# Patient Record
Sex: FEMALE | Race: BLACK OR AFRICAN AMERICAN | NOT HISPANIC OR LATINO | Employment: FULL TIME | URBAN - METROPOLITAN AREA
[De-identification: names, ages, dates, MRNs, and addresses within clinical notes are randomized per-mention and may not be internally consistent; named-entity substitution may affect disease eponyms.]

---

## 2019-09-03 PROBLEM — F41.1 GAD (GENERALIZED ANXIETY DISORDER): Status: ACTIVE | Noted: 2019-09-03

## 2020-10-17 ENCOUNTER — NURSE TRIAGE (OUTPATIENT)
Dept: ADMINISTRATIVE | Facility: CLINIC | Age: 25
End: 2020-10-17

## 2022-07-21 PROBLEM — N39.0 UTI (URINARY TRACT INFECTION): Status: ACTIVE | Noted: 2022-07-21

## 2022-07-21 PROBLEM — J45.20 MILD INTERMITTENT ASTHMA: Status: ACTIVE | Noted: 2022-07-21

## 2022-07-21 PROBLEM — A41.9 SEVERE SEPSIS: Status: ACTIVE | Noted: 2022-07-21

## 2022-07-21 PROBLEM — R65.20 SEVERE SEPSIS: Status: ACTIVE | Noted: 2022-07-21

## 2023-04-04 NOTE — TELEPHONE ENCOUNTER
Reason for Disposition   Palpitations, skipped heart beat, or rapid heart beat   Nursing judgment    Additional Information   Negative: Severe difficulty breathing (e.g., struggling for each breath, speaks in single words)   Negative: Bluish (or gray) lips or face now   Negative: Difficult to awaken or acting confused (e.g., disoriented, slurred speech)   Negative: Hysterical or combative behavior   Negative: Sounds like a life-threatening emergency to the triager   Negative: Chest pain   Negative: Nursing judgment   Negative: Information only call; adult is not ill or injured   Negative: Nursing judgment   Negative: Nursing judgment   Negative: Nursing judgment   Negative: Nursing judgment   Negative: Nursing judgment   Negative: Nursing judgment   Negative: Nursing judgment   Negative: Nursing judgment   Negative: Nursing judgment    Protocols used: ANXIETY AND PANIC ATTACK-A-, NO GUIDELINE OR REFERENCE [id number]  pt speaking rapidly and loudly re not sure if she is having a anxiety attack. Denies pain. Pt repeatedly states she has a hx anxiety. Pt admits to concern re when getting up too fast gets off balance. Pt states she is  doing house work today.  parent had birthday party recently but pt denies being exposed to virus because everyone at party wearing masks. /99 T97.6 O2 sat=? , having heart palps. can feel heart beating form chest. O2 sat=100/150. Pt read online to do exercise to bring heart rate down.  O2 sat +99/151. Having acid reflux for a minute. Eating and drinking- pt states she is drinking water, ate cereal banan, vit D, drank coffee. Didnt eat lunch. Parent away at store and preparing to make lunch. BP 93/56 HR= ?, rechecked BP= HR 71 BP 95/48   Pt admits she was upset with parents yest. Pt states she lives with her parents. felt hot yest. denies SI/HI, hx allergies. rec pt eat lunch. Pt states she will eat oatmeal and water. Pt admits she is feeling better now.  How Severe Is Your Skin Lesion?: moderate Has Your Skin Lesion Been Treated?: not been treated call back with questions    Is This A New Presentation, Or A Follow-Up?: Skin Lesion Additional History: Pt reports acne is better with tretinoin, which she uses as needed.

## 2023-10-04 PROBLEM — F41.1 GAD (GENERALIZED ANXIETY DISORDER): Chronic | Status: ACTIVE | Noted: 2019-09-03
